# Patient Record
Sex: MALE | Race: WHITE | HISPANIC OR LATINO | URBAN - METROPOLITAN AREA
[De-identification: names, ages, dates, MRNs, and addresses within clinical notes are randomized per-mention and may not be internally consistent; named-entity substitution may affect disease eponyms.]

---

## 2021-11-10 ENCOUNTER — OFFICE VISIT (OUTPATIENT)
Dept: URBAN - METROPOLITAN AREA CLINIC 98 | Facility: CLINIC | Age: 66
End: 2021-11-10
Payer: COMMERCIAL

## 2021-11-10 DIAGNOSIS — K52.9 CHRONIC DIARRHEA: ICD-10-CM

## 2021-11-10 PROBLEM — 62315008 DIARRHEA: Status: ACTIVE | Noted: 2021-11-10

## 2021-11-10 PROCEDURE — 99203 OFFICE O/P NEW LOW 30 MIN: CPT | Performed by: INTERNAL MEDICINE

## 2021-11-10 PROCEDURE — 99243 OFF/OP CNSLTJ NEW/EST LOW 30: CPT | Performed by: INTERNAL MEDICINE

## 2021-11-10 RX ORDER — GLUCOSAMINE SULFATE 500 MG
1 CAPSULE WITH A MEAL CAPSULE ORAL THREE TIMES A DAY
Status: ACTIVE | COMMUNITY

## 2021-11-10 RX ORDER — TAMSULOSIN HYDROCHLORIDE 0.4 MG/1
1 CAPSULE CAPSULE ORAL ONCE A DAY
Status: ACTIVE | COMMUNITY

## 2021-11-10 RX ORDER — SODIUM, POTASSIUM,MAG SULFATES 17.5-3.13G
354ML SOLUTION, RECONSTITUTED, ORAL ORAL
Qty: 345 MILLILITER | OUTPATIENT
Start: 2021-11-10 | End: 2021-11-11

## 2021-11-10 NOTE — HPI-TODAY'S VISIT:
Patient referred by Raul Wu MD for evaluation of bloody diarrhea. Copy of this consult OV sent to Dr. Wu. 67 yo pt w 2 mos diarrhea w/ rectal bleeding, w/o rectal - perineal nor lower abdominal pain. No change in diet and no use of abx's prior to his diarrhea. Seen by LMD: Cipro wo response. Seen by Dr. Wu: Medrol-dosepak w/ resolution of diarrhea. Today, he feesl better, no diarrhea and occasional blood in the stools w straining. Occasional loose stools and no nocturnal diarrhea. No anorexia or weight loss. No fever or chills. No recent travel. Had normal labs but stool tests haven't been done. Colonoscopy 2011: colon polyps. No FHx IBD. No COVID-19 exposure and has received 2 doses of COVID-19 vaccine. No other complaints.

## 2021-11-11 ENCOUNTER — TELEPHONE ENCOUNTER (OUTPATIENT)
Dept: URBAN - METROPOLITAN AREA CLINIC 23 | Facility: CLINIC | Age: 66
End: 2021-11-11

## 2021-11-11 ENCOUNTER — DASHBOARD ENCOUNTERS (OUTPATIENT)
Age: 66
End: 2021-11-11

## 2022-01-03 ENCOUNTER — OFFICE VISIT (OUTPATIENT)
Dept: URBAN - METROPOLITAN AREA SURGERY CENTER 18 | Facility: SURGERY CENTER | Age: 67
End: 2022-01-03
Payer: COMMERCIAL

## 2022-01-03 ENCOUNTER — CLAIMS CREATED FROM THE CLAIM WINDOW (OUTPATIENT)
Dept: URBAN - METROPOLITAN AREA CLINIC 4 | Facility: CLINIC | Age: 67
End: 2022-01-03
Payer: COMMERCIAL

## 2022-01-03 DIAGNOSIS — K63.89 POLYP, HYPERPLASTIC: ICD-10-CM

## 2022-01-03 DIAGNOSIS — R19.7 ACUTE DIARRHEA: ICD-10-CM

## 2022-01-03 PROCEDURE — 88305 TISSUE EXAM BY PATHOLOGIST: CPT | Performed by: PATHOLOGY

## 2022-01-03 PROCEDURE — G8907 PT DOC NO EVENTS ON DISCHARG: HCPCS | Performed by: INTERNAL MEDICINE

## 2022-01-03 PROCEDURE — 88342 IMHCHEM/IMCYTCHM 1ST ANTB: CPT | Performed by: PATHOLOGY

## 2022-01-03 PROCEDURE — 45380 COLONOSCOPY AND BIOPSY: CPT | Performed by: INTERNAL MEDICINE

## 2022-01-03 PROCEDURE — 88313 SPECIAL STAINS GROUP 2: CPT | Performed by: PATHOLOGY

## 2022-01-03 RX ORDER — GLUCOSAMINE SULFATE 500 MG
1 CAPSULE WITH A MEAL CAPSULE ORAL THREE TIMES A DAY
Status: ACTIVE | COMMUNITY

## 2022-01-03 RX ORDER — TAMSULOSIN HYDROCHLORIDE 0.4 MG/1
1 CAPSULE CAPSULE ORAL ONCE A DAY
Status: ACTIVE | COMMUNITY